# Patient Record
Sex: MALE | Race: WHITE | NOT HISPANIC OR LATINO
[De-identification: names, ages, dates, MRNs, and addresses within clinical notes are randomized per-mention and may not be internally consistent; named-entity substitution may affect disease eponyms.]

---

## 2021-03-28 ENCOUNTER — TRANSCRIPTION ENCOUNTER (OUTPATIENT)
Age: 31
End: 2021-03-28

## 2022-02-20 ENCOUNTER — EMERGENCY (EMERGENCY)
Facility: HOSPITAL | Age: 32
LOS: 0 days | Discharge: HOME | End: 2022-02-21
Attending: EMERGENCY MEDICINE
Payer: MEDICAID

## 2022-02-20 VITALS
RESPIRATION RATE: 18 BRPM | HEIGHT: 70 IN | TEMPERATURE: 98 F | OXYGEN SATURATION: 98 % | WEIGHT: 169.98 LBS | DIASTOLIC BLOOD PRESSURE: 93 MMHG | HEART RATE: 100 BPM | SYSTOLIC BLOOD PRESSURE: 144 MMHG

## 2022-02-20 DIAGNOSIS — Z91.018 ALLERGY TO OTHER FOODS: ICD-10-CM

## 2022-02-20 DIAGNOSIS — R45.851 SUICIDAL IDEATIONS: ICD-10-CM

## 2022-02-20 DIAGNOSIS — F41.9 ANXIETY DISORDER, UNSPECIFIED: ICD-10-CM

## 2022-02-20 DIAGNOSIS — F31.30 BIPOLAR DISORDER, CURRENT EPISODE DEPRESSED, MILD OR MODERATE SEVERITY, UNSPECIFIED: ICD-10-CM

## 2022-02-20 DIAGNOSIS — Z88.0 ALLERGY STATUS TO PENICILLIN: ICD-10-CM

## 2022-02-20 DIAGNOSIS — F43.0 ACUTE STRESS REACTION: ICD-10-CM

## 2022-02-20 DIAGNOSIS — U07.1 COVID-19: ICD-10-CM

## 2022-02-20 LAB
ALBUMIN SERPL ELPH-MCNC: 4.3 G/DL — SIGNIFICANT CHANGE UP (ref 3.3–5)
ALP SERPL-CCNC: 142 U/L — HIGH (ref 40–120)
ALT FLD-CCNC: 332 U/L — HIGH (ref 12–78)
ANION GAP SERPL CALC-SCNC: 6 MMOL/L — SIGNIFICANT CHANGE UP (ref 5–17)
APAP SERPL-MCNC: < 2 UG/ML — SIGNIFICANT CHANGE UP (ref 10–30)
APPEARANCE UR: CLEAR — SIGNIFICANT CHANGE UP
AST SERPL-CCNC: 127 U/L — HIGH (ref 15–37)
BASOPHILS # BLD AUTO: 0.02 K/UL — SIGNIFICANT CHANGE UP (ref 0–0.2)
BASOPHILS NFR BLD AUTO: 0.3 % — SIGNIFICANT CHANGE UP (ref 0–2)
BILIRUB SERPL-MCNC: 0.7 MG/DL — SIGNIFICANT CHANGE UP (ref 0.2–1.2)
BILIRUB UR-MCNC: NEGATIVE — SIGNIFICANT CHANGE UP
BUN SERPL-MCNC: 11 MG/DL — SIGNIFICANT CHANGE UP (ref 7–23)
CALCIUM SERPL-MCNC: 9.9 MG/DL — SIGNIFICANT CHANGE UP (ref 8.5–10.1)
CHLORIDE SERPL-SCNC: 105 MMOL/L — SIGNIFICANT CHANGE UP (ref 96–108)
CO2 SERPL-SCNC: 30 MMOL/L — SIGNIFICANT CHANGE UP (ref 22–31)
COLOR SPEC: YELLOW — SIGNIFICANT CHANGE UP
CREAT SERPL-MCNC: 0.98 MG/DL — SIGNIFICANT CHANGE UP (ref 0.5–1.3)
DIFF PNL FLD: NEGATIVE — SIGNIFICANT CHANGE UP
EOSINOPHIL # BLD AUTO: 0.09 K/UL — SIGNIFICANT CHANGE UP (ref 0–0.5)
EOSINOPHIL NFR BLD AUTO: 1.4 % — SIGNIFICANT CHANGE UP (ref 0–6)
ETHANOL SERPL-MCNC: <10 MG/DL — SIGNIFICANT CHANGE UP (ref 0–10)
GLUCOSE SERPL-MCNC: 94 MG/DL — SIGNIFICANT CHANGE UP (ref 70–99)
GLUCOSE UR QL: NEGATIVE — SIGNIFICANT CHANGE UP
HCT VFR BLD CALC: 48.2 % — SIGNIFICANT CHANGE UP (ref 39–50)
HGB BLD-MCNC: 15.3 G/DL — SIGNIFICANT CHANGE UP (ref 13–17)
IMM GRANULOCYTES NFR BLD AUTO: 0.2 % — SIGNIFICANT CHANGE UP (ref 0–1.5)
KETONES UR-MCNC: NEGATIVE — SIGNIFICANT CHANGE UP
LEUKOCYTE ESTERASE UR-ACNC: ABNORMAL
LYMPHOCYTES # BLD AUTO: 1.69 K/UL — SIGNIFICANT CHANGE UP (ref 1–3.3)
LYMPHOCYTES # BLD AUTO: 25.7 % — SIGNIFICANT CHANGE UP (ref 13–44)
MCHC RBC-ENTMCNC: 27 PG — SIGNIFICANT CHANGE UP (ref 27–34)
MCHC RBC-ENTMCNC: 31.7 GM/DL — LOW (ref 32–36)
MCV RBC AUTO: 85.2 FL — SIGNIFICANT CHANGE UP (ref 80–100)
MONOCYTES # BLD AUTO: 0.37 K/UL — SIGNIFICANT CHANGE UP (ref 0–0.9)
MONOCYTES NFR BLD AUTO: 5.6 % — SIGNIFICANT CHANGE UP (ref 2–14)
NEUTROPHILS # BLD AUTO: 4.4 K/UL — SIGNIFICANT CHANGE UP (ref 1.8–7.4)
NEUTROPHILS NFR BLD AUTO: 66.8 % — SIGNIFICANT CHANGE UP (ref 43–77)
NITRITE UR-MCNC: NEGATIVE — SIGNIFICANT CHANGE UP
PCP SPEC-MCNC: SIGNIFICANT CHANGE UP
PH UR: 5 — SIGNIFICANT CHANGE UP (ref 5–8)
PLATELET # BLD AUTO: 383 K/UL — SIGNIFICANT CHANGE UP (ref 150–400)
POTASSIUM SERPL-MCNC: 3.4 MMOL/L — LOW (ref 3.5–5.3)
POTASSIUM SERPL-SCNC: 3.4 MMOL/L — LOW (ref 3.5–5.3)
PROT SERPL-MCNC: 9.1 GM/DL — HIGH (ref 6–8.3)
PROT UR-MCNC: NEGATIVE — SIGNIFICANT CHANGE UP
RBC # BLD: 5.66 M/UL — SIGNIFICANT CHANGE UP (ref 4.2–5.8)
RBC # FLD: 13 % — SIGNIFICANT CHANGE UP (ref 10.3–14.5)
SALICYLATES SERPL-MCNC: <1.7 MG/DL — LOW (ref 2.8–20)
SODIUM SERPL-SCNC: 141 MMOL/L — SIGNIFICANT CHANGE UP (ref 135–145)
SP GR SPEC: 1.02 — SIGNIFICANT CHANGE UP (ref 1.01–1.02)
TSH SERPL-MCNC: 1.96 UU/ML — SIGNIFICANT CHANGE UP (ref 0.34–4.82)
UROBILINOGEN FLD QL: NEGATIVE — SIGNIFICANT CHANGE UP
WBC # BLD: 6.58 K/UL — SIGNIFICANT CHANGE UP (ref 3.8–10.5)
WBC # FLD AUTO: 6.58 K/UL — SIGNIFICANT CHANGE UP (ref 3.8–10.5)

## 2022-02-20 PROCEDURE — 99285 EMERGENCY DEPT VISIT HI MDM: CPT | Mod: 25

## 2022-02-20 PROCEDURE — 93005 ELECTROCARDIOGRAM TRACING: CPT

## 2022-02-20 PROCEDURE — 80053 COMPREHEN METABOLIC PANEL: CPT

## 2022-02-20 PROCEDURE — 36415 COLL VENOUS BLD VENIPUNCTURE: CPT

## 2022-02-20 PROCEDURE — 80307 DRUG TEST PRSMV CHEM ANLYZR: CPT

## 2022-02-20 PROCEDURE — 90792 PSYCH DIAG EVAL W/MED SRVCS: CPT | Mod: 95

## 2022-02-20 PROCEDURE — U0003: CPT

## 2022-02-20 PROCEDURE — 99285 EMERGENCY DEPT VISIT HI MDM: CPT

## 2022-02-20 PROCEDURE — 84443 ASSAY THYROID STIM HORMONE: CPT

## 2022-02-20 PROCEDURE — 81001 URINALYSIS AUTO W/SCOPE: CPT

## 2022-02-20 PROCEDURE — U0005: CPT

## 2022-02-20 PROCEDURE — 85025 COMPLETE CBC W/AUTO DIFF WBC: CPT

## 2022-02-20 PROCEDURE — 93010 ELECTROCARDIOGRAM REPORT: CPT

## 2022-02-20 NOTE — ED ADULT TRIAGE NOTE - CHIEF COMPLAINT QUOTE
Pt arrives from home, c/o suicidal thoughts. As per EMS, pt was telling family to kill him. In triage, denies SI/HI, any active plan to harm himself, drug/alcohol use, and hallucinations. Recently was weaned off Lexapro and last took it 2 weeks ago. Tested COVID+ on 2/11. Denies any physical complaints. Stressors include being in quarantine and having poor PO intake. 1:1 initiated for safety.

## 2022-02-20 NOTE — ED PROVIDER NOTE - OBJECTIVE STATEMENT
30 y/o male w/ a PMHx of anxiety presents to the ED via EMS c/o SI. As per EMS, pt was telling family to kill him. Pt says dad called EMS because pt was anxious due to isolation from COVID and his decreased appetite. Pt was unaware his father called EMS and unaware why he is here. Denies SI/HI in ED. Pt was recently weaned off Lexapro and last took it 2 weeks ago. Pt tested COVID+ on 2/11. Pt has no other complaints at this time. Occasional marijuana smoker. 30 y/o male w/ a PMHx of anxiety presents to the ED via EMS c/o SI. As per EMS, pt was telling family to kill him. Pt says dad called EMS because pt was anxious due to quarantine isolation from recent COVID infection and his decreased appetite (COVID symptoms were mild, + resolved). Pt was unaware his father called EMS and unaware why he is here. Denies SI/HI in ED. Pt was recently weaned off Lexapro, last took it 2 weeks ago, prior to COVID quarantine. Pt tested COVID+ on 2/11. Pt has no other complaints at this time. Occasional marijuana smoker.

## 2022-02-20 NOTE — ED BEHAVIORAL HEALTH ASSESSMENT NOTE - NSBHSATHC_PSY_A_CORE FT
once daily use on average w/ last use yesterday or the day before stating "I haven't really been smoking since the COVID."

## 2022-02-20 NOTE — ED BEHAVIORAL HEALTH ASSESSMENT NOTE - DETAILS
as above Verbal SP for patient to return to the ED if sx worsen or suicidality or other safety threats arise or if sx persist and he desires inpt care BTCM discussed w/ parents

## 2022-02-20 NOTE — ED BEHAVIORAL HEALTH ASSESSMENT NOTE - NSACTIVEVENT_PSY_ALL_CORE
COVID-19 infection & quarantine/Triggering events leading to humiliation, shame, and/or despair (e.g., Loss of relationship, financial or health status) (real or anticipated)/Chronic pain or other acute medical condition

## 2022-02-20 NOTE — ED PROVIDER NOTE - PATIENT PORTAL LINK FT
You can access the FollowMyHealth Patient Portal offered by [facility] by registering at the following website: [email or web address]. By joining [employer]’s FollowMyHealth portal, you will also be able to view your health information using other applications (apps) compatible with our system.

## 2022-02-20 NOTE — ED BEHAVIORAL HEALTH ASSESSMENT NOTE - OTHER
"self-employed as a " parents superficially cooperative at times w/ observed display of frustration surrounding being in the ED/having a forced psychiatric assessment annoyed Records Checked: [facility], [facility], [facility], [facility], [facility], [facility], [facility], [facility], [facility], [facility], [facility], [facility], [facility], [facility], [facility], [facility], [facility], [facility], [facility], [facility], [place] [facility], Social Media (For example - Facebook, [employer], [employer]), Web search, Forensic Databases not observed

## 2022-02-20 NOTE — ED PROVIDER NOTE - CARE PLAN
Principal Discharge DX:	Major depression   1 Principal Discharge DX:	Acute stress reaction  Secondary Diagnosis:	Persistent depressive disorder with anxious distress, currently moderate

## 2022-02-20 NOTE — ED BEHAVIORAL HEALTH NOTE - BEHAVIORAL HEALTH NOTE
===================  PRE-HOSPITAL COURSE  ===================  SOURCE:  RN and secondhand ED documentation.  DETAILS:  Per chart, patient was BIB mother and father due to pt expressing SI, asking parents to kill him. Patient reported that he had recently tapered off of his Lexapro and is not currently taking any psychotropic medications at the moment.      ============  ED COURSE  ============  SOURCE:  RN and secondhand ED documentation.  ARRIVAL:  Per chart and RN, patient arrived as a walk in, calm and compliant with triage.  BELONGINGS:  No belongings of note. All belongings were provided to hospital security, and patient is currently in a gown with a 1:1 staff member.  BEHAVIOR: RN described patient to be currently calm and cooperative, presenting with linear thought process and thought content WNL, is AAOx3, presenting with depressed mood and sad affect, remains in good behavioral and impulse control, is not currently violent/aggressive. RN stated that the patient is denying SI/HI/A/VH. RN stated that there are no visible marks, bruises, or lacerations on the body. RN stated that the patient appears to be well-groomed. RN stated patient had poor insight as to why he was currently in the ED.  TREATMENT:  Per chart and RN, patient did not require PRN medications.   VISITORS: None. ===================  PRE-HOSPITAL COURSE  ===================  SOURCE:  RN and secondhand ED documentation.  DETAILS:  Per chart, patient was BIB mother and father due to pt expressing SI, asking parents to kill him. Patient reported that he had recently tapered off of his Lexapro and is not currently taking any psychotropic medications at the moment.      ============  ED COURSE  ============  SOURCE:  RN and secondhand ED documentation.  ARRIVAL:  Per chart and RN, patient arrived as a walk in, calm and compliant with triage.  BELONGINGS:  No belongings of note. All belongings were provided to hospital security, and patient is currently in a gown with a 1:1 staff member.  BEHAVIOR: RN described patient to be currently calm and cooperative, presenting with linear thought process and thought content WNL, is AAOx3, presenting with depressed mood and sad affect, remains in good behavioral and impulse control, is not currently violent/aggressive. RN stated that the patient is denying SI/HI/A/VH. RN stated that there are no visible marks, bruises, or lacerations on the body. RN stated that the patient appears to be well-groomed. RN stated patient had poor insight as to why he was currently in the ED.  TREATMENT:  Per chart and RN, patient did not require PRN medications.   VISITORS: None.    [facility] attempted to contact pt's mother [phone] however she was unavailable. [facility] left a v/m requesting a call back. ===================  PRE-HOSPITAL COURSE  ===================  SOURCE:  RN and secondhand ED documentation.  DETAILS:  Per chart, patient was BIB EMS activated by parents due to pt expressing SI, asking parents to kill him. Patient reported that he had recently tapered off of his Lexapro and is not currently taking any psychotropic medications at the moment.      ============  ED COURSE  ============  SOURCE:  RN and secondhand ED documentation.  ARRIVAL:  Per chart and RN, patient arrived via EMS, calm and compliant with triage.  BELONGINGS:  No belongings of note. All belongings were provided to hospital security, and patient is currently in a gown with a 1:1 staff member.  BEHAVIOR: RN described patient to be currently calm and cooperative, presenting with linear thought process and thought content WNL, is AAOx3, presenting with depressed mood and sad affect, remains in good behavioral and impulse control, is not currently violent/aggressive. RN stated that the patient is denying SI/HI/A/VH. RN stated that there are no visible marks, bruises, or lacerations on the body. RN stated that the patient appears to be well-groomed. RN stated patient had poor insight as to why he was currently in the ED.  TREATMENT:  Per chart and RN, patient did not require PRN medications.   VISITORS: None.    [facility] spoke with patient's mother and father, Shannon, on [phone]. Patient is a 31M domiciled at home with his mother and father, single, without children, non-caregiver, unemployed. Patient was BIB parents due to concerns of worsening depressive symptoms and SI. Parents stated that patient was originally on Lexapro 20MG QD, and started to wean off of medications on July down to 5MG, and as of 3 weeks ago has completely been off of medications. Parents stated that the patient was offered to be on a different psychotropic medication, however patient refused to be on any medication as he felt that it was not helping. Patient had then tested positive for COVID one week ago, and subsequently parents started observing pt to be more depressed, marked by low motivation, poor sleep and appetite, low energy, and frequent crying spells. Parents stated that for the past week, pt expressed passive SI without plan/intent and denies recent SA/self-injury, makes statements such as "I don't want to be awake" and "why can't you kill me mom?", no plan or intent. Parents are concerned that the patient's symptoms have not been improving and patient refuses to comply with outpatient psychiatric care, which led to parents calling 911 for patient to be brought to the ED. Parents stated that the patient was laying on the floor today, and needed to be persuaded by EMS to come to the hospital for evaluation. Parents denied pt was aggressive/hallucinating.     Parents stated that at baseline, patient has depressed mood, engages well with others, remains calm, with linear thought process. Parents stated that the patient smokes marijuana daily. Patient was admitted to [facility] approx 7-8 yrs ago for agitation, and since d/c has been following up with outpatient care. Parents denied pt has legal hx, denied other substance abuse, denies access to firearms. Parents report pt is allergic to Penicillin.     BTCM spoke with parents to inform them that the recommended level of care is continued outpatient care, and IPP admission is not warranted at this time. Parents expressed concern that the patient's symptoms may not improve and that they will continue to worsen. BTCM provided psychoeducation regarding criteria for inpatient admission and why outpatient care is recommended at this time. BTCM advised parents to bring patient back to the ED in the event of future psychiatric emergency. Parents verbalized understanding of the plan.     COVID Exposure Screen- collateral (i.e. third-party, chart review, belongings, etc; include EMS and ED staff)  1.        *Has the patient had a COVID-19 test in the last 90 days?  ( x ) Yes   (  ) No   (  ) Unknown- Reason: _____  IF YES PROCEED TO QUESTION #2. IF NO OR UNKNOWN, PLEASE SKIP TO QUESTION #3.  2.        Date of test(s) and result(s): 2/11/2022  3.        *Has the patient tested positive for COVID-19 antibodies? (  ) Yes   ( x ) No   (  ) Unknown- Reason: _____  IF YES PRO  CEED TO QUESTION #4. IF NO or UNKNOWN, PLEASE SKIP TO QUESTION #5.  4.        Date of positive antibody test: ________  5.        *Has the patient received 2 doses of the COVID-19 vaccine? (  ) Yes   (x  ) No   (  ) Unknown- Reason: _____  IF YES PROCEED TO QUESTION #6. IF NO or UNKNOWN, PLEASE SKIP TO QUESTION #7.  6.         Date of second dose: ________  7.        *In the past 10 days, has the patient been around anyone with a positive COVID-19 test?* (  ) Yes   ( x ) No   (  ) Unknown- Reason: __  IF YES PROCEED TO QUESTION #8. IF NO or UNKNOWN, PLEASE SKIP TO QUESTION #13.  8.        Was the patient within 6 feet of them for at least 15 minutes? (  ) Yes   (  ) No   (  ) Unknown- Reason: ____  9.        Did the patient provide care for them? (  ) Yes   (  ) No   (  ) Unknown- Reason: ______  10.     Did the patient have direct physical contact with them (touched, hugged, or kissed them)? (  ) Yes   (  ) No	(  ) Unknown- Reason: __  11.     Did the patient share eating or drinking utensils with them? (  ) Yes   (  ) No	(  ) Unknown- Reason: ____  12.     Did they sneeze, cough, or somehow get respiratory droplets on the patient? (  ) Yes   (  ) No	(  ) Unknown- Reason: ______  13.     *Has the patient been out of [place] within the past 10 days?* (  ) Yes   ( x ) No   (  ) Unknown- Reason: _____  IF YES PLEASE ANSWER THE FOLLOWING QUESTIONS:  14.     Which state/country did they go to? ______  15.     Were they there over 24 hours? (  ) Yes   (  ) No	(  ) Unknown- Reason: ______ ===================  PRE-HOSPITAL COURSE  ===================  SOURCE:  RN and secondhand ED documentation.  DETAILS:  Per chart, patient was BIB EMS activated by parents due to pt expressing SI, asking parents to kill him. Patient reported that he had recently tapered off of his Lexapro and is not currently taking any psychotropic medications at the moment.      ============  ED COURSE  ============  SOURCE:  RN and secondhand ED documentation.  ARRIVAL:  Per chart and RN, patient arrived via EMS, calm and compliant with triage.  BELONGINGS:  No belongings of note. All belongings were provided to hospital security, and patient is currently in a gown with a 1:1 staff member.  BEHAVIOR: RN described patient to be currently calm and cooperative, presenting with linear thought process and thought content WNL, is AAOx3, presenting with depressed mood and sad affect, remains in good behavioral and impulse control, is not currently violent/aggressive. RN stated that the patient is denying SI/HI/A/VH. RN stated that there are no visible marks, bruises, or lacerations on the body. RN stated that the patient appears to be well-groomed. RN stated patient had poor insight as to why he was currently in the ED.  TREATMENT:  Per chart and RN, patient did not require PRN medications.   VISITORS: None.    [facility] spoke with patient's mother and father, Shannon, on [phone]. Patient is a 31M domiciled at home with his mother and father, single, without children, non-caregiver, unemployed. Patient was BIB parents due to concerns of worsening depressive symptoms and SI. Parents stated that patient was originally on Lexapro 20MG QD, and started to wean off of medications on July down to 5MG, and as of 3 weeks ago has completely been off of medications. Parents stated that the patient was offered to be on a different psychotropic medication, however patient refused to be on any medication as he felt that it was not helping. Patient had then tested positive for COVID one week ago, and subsequently parents started observing pt to be more depressed, marked by low motivation, poor sleep and appetite, low energy, and frequent crying spells. Parents stated that for the past week, pt expressed passive SI without plan/intent and denies recent SA/self-injury, makes statements such as "I don't want to be awake" and "why can't you kill me mom?", no plan or intent. Parents are concerned that the patient's symptoms have not been improving and patient refuses to comply with outpatient psychiatric care, which led to parents calling 911 for patient to be brought to the ED. Parents stated that the patient was laying on the floor today expressing passive SI repetitively, and needed to be persuaded by EMS to come to the hospital for evaluation. Parents denied pt was aggressive/hallucinating.     Parents stated that at baseline, patient has depressed mood, engages well with others, remains calm, with linear thought process. Parents stated that the patient smokes marijuana daily. Patient was admitted to [facility] approx 7-8 yrs ago for agitation, and since d/c has been following up with outpatient care. Parents denied pt has legal hx, denied other substance abuse, denies access to firearms. Parents report pt is allergic to Penicillin.     CM spoke with parents to inform them that the recommended level of care is continued outpatient care, and IPP admission is not warranted at this time. Parents expressed concern that the patient's symptoms may not improve and that they will continue to worsen. CM provided psychoeducation regarding criteria for inpatient admission and why outpatient care is recommended at this time. BTCM advised parents to bring patient back to the ED in the event of future psychiatric emergency. Parents verbalized understanding of the plan.     COVID Exposure Screen- collateral (i.e. third-party, chart review, belongings, etc; include EMS and ED staff)  1.        *Has the patient had a COVID-19 test in the last 90 days?  ( x ) Yes   (  ) No   (  ) Unknown- Reason: _____  IF YES PROCEED TO QUESTION #2. IF NO OR UNKNOWN, PLEASE SKIP TO QUESTION #3.  2.        Date of test(s) and result(s): 2/11/2022  3.        *Has the patient tested positive for COVID-19 antibodies? (  ) Yes   ( x ) No   (  ) Unknown- Reason: _____  IF YES PRO  CEED TO QUESTION #4. IF NO or UNKNOWN, PLEASE SKIP TO QUESTION #5.  4.        Date of positive antibody test: ________  5.        *Has the patient received 2 doses of the COVID-19 vaccine? (  ) Yes   (x  ) No   (  ) Unknown- Reason: _____  IF YES PROCEED TO QUESTION #6. IF NO or UNKNOWN, PLEASE SKIP TO QUESTION #7.  6.         Date of second dose: ________  7.        *In the past 10 days, has the patient been around anyone with a positive COVID-19 test?* (  ) Yes   ( x ) No   (  ) Unknown- Reason: __  IF YES PROCEED TO QUESTION #8. IF NO or UNKNOWN, PLEASE SKIP TO QUESTION #13.  8.        Was the patient within 6 feet of them for at least 15 minutes? (  ) Yes   (  ) No   (  ) Unknown- Reason: ____  9.        Did the patient provide care for them? (  ) Yes   (  ) No   (  ) Unknown- Reason: ______  10.     Did the patient have direct physical contact with them (touched, hugged, or kissed them)? (  ) Yes   (  ) No	(  ) Unknown- Reason: __  11.     Did the patient share eating or drinking utensils with them? (  ) Yes   (  ) No	(  ) Unknown- Reason: ____  12.     Did they sneeze, cough, or somehow get respiratory droplets on the patient? (  ) Yes   (  ) No	(  ) Unknown- Reason: ______  13.     *Has the patient been out of [place] within the past 10 days?* (  ) Yes   ( x ) No   (  ) Unknown- Reason: _____  IF YES PLEASE ANSWER THE FOLLOWING QUESTIONS:  14.     Which state/country did they go to? ______  15.     Were they there over 24 hours? (  ) Yes   (  ) No	(  ) Unknown- Reason: ______

## 2022-02-20 NOTE — ED PROVIDER NOTE - PROGRESS NOTE DETAILS
Zacarias Rojas for attending Dr. Ramirez:  As per parents, pt has had multiple conversations over text where he expresses SI. Zacarias Rojas for attending Dr. Ramirez:  Tele psych aware of ED consult. Pt cleared for d/c by tele-psychiatry. Erich Lopez DO

## 2022-02-20 NOTE — ED PROVIDER NOTE - MUSCULOSKELETAL, MLM
Spine appears normal, range of motion is not limited, no muscle or joint tenderness Spine appears normal, range of motion is not limited, no muscle or joint tenderness.  GAINES x 4.

## 2022-02-20 NOTE — ED BEHAVIORAL HEALTH ASSESSMENT NOTE - SUMMARY
This is a 31 year old single,  self-employed male, non-caregiver, domiciled with parents, with past psychiatric history of Major Depressive Disorder and Generalized Anxiety, non-adherent to outpatient treatment, recently weaned off of Lexapro (last dose 2-3 weeks ago; not currently on psychotropic medications), one past psychiatric admission ([facility] 12/26-12/29/ for depression & irritability iso of medication non-adherence and chronic cannabis use), no known suicide attempts or non-suicidal self injury, no history of violence, aggression, legal issues or substance use and past medical history of recent COVID-19 infection (dx w/+test 2/11/22) who presents to the ED BIB EMS activated by parents after patient reportedly conveyed a suicidal statement. In the ED, he is denying any SI but expresses feeling recently more anxious in the setting of having to quarantine & COVID related symptoms. Psychiatric assessment and collateral review are consistent with acute stress as conveyed by patient without evident suicidality. He does have an underlying persistent vs chronic major depressive disorder that contributes to his proclivity for irritability and poor distress tolerance and could benefit from re-engaging in pharmacological and psychotherapeutic strategies to address this. Nonetheless, he is not actively suicidal, homicidal, manic or psychotic and does not meet criteria for involuntary psychiatric hospitalization at this time. This is a 31 year old single,  self-employed male, non-caregiver, domiciled with parents, with past psychiatric history of Major Depressive Disorder and Generalized Anxiety, non-adherent to outpatient treatment, recently weaned off of Lexapro (last dose 2-3 weeks ago; not currently on psychotropic medications), one past psychiatric admission ([facility] 12/26-12/29/11 for depression & irritability iso of medication non-adherence and chronic cannabis use), no known suicide attempts or non-suicidal self injury, no history of violence, aggression, legal issues or substance use and past medical history of recent COVID-19 infection (dx w/+test 2/11/22) who presents to the ED BIB EMS activated by parents after patient reportedly conveyed a suicidal statement. In the ED, he is denying any SI but expresses feeling recently more anxious in the setting of having to quarantine & COVID related symptoms. Psychiatric assessment and collateral review are consistent with acute stress as conveyed by patient without evident suicidality. He does have an underlying persistent vs chronic major depressive disorder that contributes to his proclivity for irritability and poor distress tolerance and could benefit from re-engaging in pharmacological and psychotherapeutic strategies to address this. Nonetheless, he is not actively suicidal, homicidal, manic or psychotic and does not meet criteria for involuntary psychiatric hospitalization at this time.

## 2022-02-20 NOTE — ED PROVIDER NOTE - CLINICAL SUMMARY MEDICAL DECISION MAKING FREE TEXT BOX
30 y/o with hx of anxiety recently weaned of Lexapro, last does 2 weeks ago. Has been on home quarantining x10 dyas regarding +COVID(mild symptoms) no longer symptomatic. BIBA from home after texts and verbal statements expressing SI asking them to kill him. Pt denies everything in ED but parents have texts on the phone. Plan: 1 to 1, EKG, psych, labs, tele psych eval, and observation. 30 y/o with hx of anxiety recently weaned of Lexapro, last does 2 weeks ago. Has been on home quarantining x10 dyas regarding +COVID(mild symptoms) no longer symptomatic. BIBA from home after texts and verbal statements expressing SI asking them to kill him. Pt denies everything in ED but parents have texts on the phone. Plan: 1 to 1, EKG, psych, labs, tele-psych eval, and observation.

## 2022-02-20 NOTE — ED PROVIDER NOTE - CONSTITUTIONAL, MLM
Well appearing, awake, alert, oriented to person, place, time/situation and in no apparent distress. Nontoxic. normal...

## 2022-02-20 NOTE — ED ADULT NURSE NOTE - OBJECTIVE STATEMENT
Pt arrives from home with parents c/o suicidal thoughts. As per father, pt was telling family to kill him. In triage, denies SI/HI, any active plan to harm himself, drug/alcohol use, and hallucinations. Recently was weaned off Lexapro and last took it 2 weeks ago. Tested COVID+ on 2/11. Denies any physical complaints. Stressors include being in quarantine and having poor PO intake. 1:1 initiated for safety. awaiting MD bedside

## 2022-02-20 NOTE — ED PROVIDER NOTE - NSFOLLOWUPINSTRUCTIONS_ED_ALL_ED_FT
Follow up with your psychiatrist in 1-3 days    Depression    WHAT YOU NEED TO KNOW:    Depression is a medical condition that causes feelings of sadness or hopelessness that do not go away. Depression may cause you to lose interest in things you used to enjoy. These feelings may interfere with your daily life.    DISCHARGE INSTRUCTIONS:    Call your local emergency number (911 in the ) if:     You think about harming yourself or someone else.      You have done something on purpose to hurt yourself.    Call your therapist or doctor if:     Your symptoms do not improve.      You cannot make it to your next appointment.       You have new symptoms.      You have questions or concerns about your condition or care.    The following resources are available at any time to help you, if needed:     National Suicide Prevention Lifeline: 1-254.625.2897 (7-774-960-TALK)      Suicide Hotline: 1-540.659.3993 (6-493-ERDKZME)      For a list of international numbers: https://[employer].org/find-help/international-resources/    Medicines:     Antidepressants may be given to improve or balance your mood. You may need to take this medicine for several weeks before you begin to feel better.      Take your medicine as directed. Contact your healthcare provider if you think your medicine is not helping or if you have side effects. Tell him of her if you are allergic to any medicine. Keep a list of the medicines, vitamins, and herbs you take. Include the amounts, and when and why you take them. Bring the list or the pill bottles to follow-up visits. Carry your medicine list with you in case of an emergency.    Therapy is often used together with medicine to relieve depression. Therapy is a way for you to talk about your feelings and anything that may be causing depression. Therapy can be done alone or in a group. It may also be done with family members or a significant other.    Self-care:     Get regular physical activity. Try to be active for 30 minutes, 3 to 5 days a week. Physical activity can help relieve depression. Work with your healthcare provider to develop a plan that you enjoy. It may help to ask someone to be active with you.      Create a regular sleep schedule. A routine can help you relax before bed. Listen to music, read, or do yoga. Try to go to bed and wake up at the same time every day. Sleep is important for emotional health.      Eat a variety of healthy foods. Healthy foods include fruits, vegetables, whole-grain breads, low-fat dairy products, lean meats, fish, and cooked beans. A healthy meal plan is low in fat, salt, and added sugar.      Do not drink alcohol or use drugs. Alcohol and drugs can make depression worse. Talk to your therapist or doctor if you need help quitting.    Follow up with your healthcare provider as directed: Your healthcare provider will monitor your progress at follow-up visits. He or she will also monitor your medicine if you take antidepressants. Your healthcare provider will ask if the medicine is helping. Tell him or her about any side effects or problems you may have with your medicine. The type or amount of medicine may need to be changed. Write down your questions so you remember to ask them during your visits.

## 2022-02-20 NOTE — ED BEHAVIORAL HEALTH ASSESSMENT NOTE - HPI (INCLUDE ILLNESS QUALITY, SEVERITY, DURATION, TIMING, CONTEXT, MODIFYING FACTORS, ASSOCIATED SIGNS AND SYMPTOMS)
This is a 31 year old single,  self-employed male, non-caregiver, domiciled with parents, with past psychiatric history of Major Depressive Disorder and Generalized Anxiety, in outpatient treatment, recently weaned off of Lexapro 5 mg daily (last dose 2 weeks ago; not currently on psychotropic medications), one past psychiatric admission (~10 years ago for a few days after abruptly coming off of medications), no known suicide attempts or non-suicidal self injury, no history of violence, aggression, legal issues or substance use and past medical history of recent COVID-19 infection (dx w/+test 2/11/22) who presents to the ED BIB EMS activated by parents after patient conveyed a suicidal statement. In the ED, he is denying any SI but expresses feeling recently more anxious in the setting of having to quarantine & COVID related symptoms. Psychiatry consulted for evaluation.     On assessment, patient relays "I was having anxiety and my dad called and I'm here." I was saying I don't want to do this anymore because I've been waking up with stomach problems due to the anxiety" and associated appetite issues. after this COVID thing my anxiety has been through the chart." He tells me that he caught COVID, selective with food to prevent nausea, feeling trapped in quarantine and with limited food choices... He relays feeling annoyed from being trapped and alone with excessive and erratic sleep pattern due to the lack of structure. He reports low energy which he attributes to the COVID noting he also had fever for 2 days, body aches, congestions then subsequently GI distress. He denies any issues with concentration impairment. He relays always being hard on himself. He denies any feelings of hopelessness, death wishes or SI. He reports having had a difficult day leading up to his ED presentation surrounding not being able to eat comfortab... "I don't want to do this" "I don't want to keep choosing the food" etc "but it's been like this for a week and a half and they didn't want to deal with it so they called the police on me."    He denies any HI, AVH, paranoia or symptoms of flip currently on in his past.    "I'm just tired and I don't want to be in this white room anymore.     COVID Exposure Screen- Patient  Have you had a COVID-19 test in the last 90 days? Yes, positive COVID test on 2/11   Have you tested positive for COVID-19 antibodies? No  Have you received 2 doses of the COVID-19 vaccine? No  In the past 10 days, have you been around anyone with a positive COVID-19 test? No, however patient is COVID positive  Have you been out of [place] within the past 10 days? No This is a 31 year old single,  self-employed male, non-caregiver, domiciled with parents, with past psychiatric history of Major Depressive Disorder and Generalized Anxiety, non-adherent to outpatient treatment, recently weaned off of Lexapro (last dose 2-3 weeks ago; not currently on psychotropic medications), one past psychiatric admission ([facility] 12/26-12/29/ for depression & irritability iso of medication non-adherence and chronic cannabis use), no known suicide attempts or non-suicidal self injury, no history of violence, aggression, legal issues or substance use and past medical history of recent COVID-19 infection (dx w/+test 2/11/22) who presents to the ED BIB EMS activated by parents after patient reportedly conveyed a suicidal statement. In the ED, he is denying any SI but expresses feeling recently more anxious in the setting of having to quarantine & COVID related symptoms. Psychiatry consulted for evaluation.     On assessment, patient relays "I was having anxiety and my dad called and I'm here." He denies expressing suicidality but rather tells me "I was saying I don't want to do this anymore because I've been waking up with stomach problems due to the anxiety" and recent / current COVID infection elaborating that he has had associated appetite issues, has to carefully select his food choices to minimize stomach upset and he's tired of eating bland foods / having to select from a list of unappealing "sick food" options everyday. He relays that "after this COVID thing my anxiety has been through the chart." He relays also feeling "trapped" with having to quarantine and with limited food choices. He relays annoyed mood in the last week or so "from being trapped and alone" with excessive (up to 12 hours total per day) and erratic sleep pattern due to the lack of structure. He reports low energy which he attributes to the COVID noting he also had fever for 2 days, body aches, congestions then subsequently GI distress. He denies any issues with concentration impairment.     Patient relays always being hard on himself and denies any uptick in self blame or feelings of worthlessness. He denies any feelings of hopelessness, death wishes or SI currently, recently or prior to ED presentation. He reports having had a difficult day leading up to his ED presentation surrounding not being able to eat comfortably and reiterates having made statements such as "I don't want to do this" "I don't want to keep choosing the food" and such "but it's been like this for a week and a half and they didn't want to deal with it so they called the police on me." He does not refute having requested his parents to kill him but rather acknowledges that it is not suicidality to make a meaningless request in frustration knowing that his parents would never oblige. He denies any HI, AVH, paranoia or symptoms of flip currently on in his past.     Patient reports having once been hospitalized approximately 10 years ago after abruptly going off of medications, noting he was only there for a few days and the treatment team kept saying he did not need to be there. He denies any other inpatient encounters, denies past suicide attempts or preparations towards suicide and denies any past instance of being physically violent or aggression. He denies any nicotine, alcohol or illicit substance use but admits to average use of cannabis once daily up until recently when he caught COVID. He relays last smoking yesterday or the day before and denies any consequences of cannabis use in his history. ROS is otherwise negative. He concludes by stating "I'm just tired and I don't want to be in this white room anymore" noting that "it's not very therapeutic." He expresses a desire to return home and is not interested in inpatient treatment at this time.     COVID Exposure Screen- Patient  Have you had a COVID-19 test in the last 90 days? Yes, positive COVID test on 2/11   Have you tested positive for COVID-19 antibodies? No  Have you received 2 doses of the COVID-19 vaccine? No  In the past 10 days, have you been around anyone with a positive COVID-19 test? No, however patient is COVID positive  Have you been out of [place] within the past 10 days? No This is a 31 year old single,  self-employed male, non-caregiver, domiciled with parents, with past psychiatric history of Major Depressive Disorder and Generalized Anxiety, non-adherent to outpatient treatment, recently weaned off of Lexapro (last dose 2-3 weeks ago; not currently on psychotropic medications), one past psychiatric admission ([facility] 12/26-12/29/11 for depression & irritability iso of medication non-adherence and chronic cannabis use), no known suicide attempts or non-suicidal self injury, no history of violence, aggression, legal issues or substance use and past medical history of recent COVID-19 infection (dx w/+test 2/11/22) who presents to the ED BIB EMS activated by parents after patient reportedly conveyed a suicidal statement. In the ED, he is denying any SI but expresses feeling recently more anxious in the setting of having to quarantine & COVID related symptoms. Psychiatry consulted for evaluation.     On assessment, patient relays "I was having anxiety and my dad called and I'm here." He denies expressing suicidality but rather tells me "I was saying I don't want to do this anymore because I've been waking up with stomach problems due to the anxiety" and recent / current COVID infection elaborating that he has had associated appetite issues, has to carefully select his food choices to minimize stomach upset and he's tired of eating bland foods / having to select from a list of unappealing "sick food" options everyday. He relays that "after this COVID thing my anxiety has been through the chart." He relays also feeling "trapped" with having to quarantine and with limited food choices. He relays annoyed mood in the last week or so "from being trapped and alone" with excessive (up to 12 hours total per day) and erratic sleep pattern due to the lack of structure. He reports low energy which he attributes to the COVID noting he also had fever for 2 days, body aches, congestions then subsequently GI distress. He denies any issues with concentration impairment.     Patient relays always being hard on himself and denies any uptick in self blame or feelings of worthlessness. He denies any feelings of hopelessness, death wishes or SI currently, recently or prior to ED presentation. He reports having had a difficult day leading up to his ED presentation surrounding not being able to eat comfortably and reiterates having made statements such as "I don't want to do this" "I don't want to keep choosing the food" and such "but it's been like this for a week and a half and they didn't want to deal with it so they called the police on me." He does not refute having requested his parents to kill him but rather acknowledges that it is not suicidality to make a meaningless request in frustration knowing that his parents would never oblige. He denies any HI, AVH, paranoia or symptoms of flip currently on in his past.     Patient reports having once been hospitalized approximately 10 years ago after abruptly going off of medications, noting he was only there for a few days and the treatment team kept saying he did not need to be there. He denies any other inpatient encounters, denies past suicide attempts or preparations towards suicide and denies any past instance of being physically violent or aggression. He denies any nicotine, alcohol or illicit substance use but admits to average use of cannabis once daily up until recently when he caught COVID. He relays last smoking yesterday or the day before and denies any consequences of cannabis use in his history. ROS is otherwise negative. He concludes by stating "I'm just tired and I don't want to be in this white room anymore" noting that "it's not very therapeutic." He expresses a desire to return home and is not interested in inpatient treatment at this time.     COVID Exposure Screen- Patient  Have you had a COVID-19 test in the last 90 days? Yes, positive COVID test on 2/11   Have you tested positive for COVID-19 antibodies? No  Have you received 2 doses of the COVID-19 vaccine? No  In the past 10 days, have you been around anyone with a positive COVID-19 test? No, however patient is COVID positive  Have you been out of [place] within the past 10 days? No

## 2022-02-20 NOTE — ED BEHAVIORAL HEALTH ASSESSMENT NOTE - DIFFERENTIAL
Acute stress reaction (stress related to COVID-19 sx & quarantine requirements)  Major vs Persistent depressive disorder w/ anxious distress  Generalized anxiety disorder  Cannabis use disorder

## 2022-02-20 NOTE — ED BEHAVIORAL HEALTH ASSESSMENT NOTE - DESCRIPTION
lives with parents; self employed as a "." single, never ; no children; denies family suicide hx or pertinent psychiatric or medical conditions in the family Buck - [phone] - Wiliam Pineda [phone] -  as per HPI ED course and collateral are as per [facility] (ED Behavioral health) note     Patient gave verbal permission to obtain collateral from collateral his parents as noted above and provided their contact information as such: Buck Van [phone] - Father & Miriam [phone] - Mother

## 2022-02-20 NOTE — ED PROVIDER NOTE - PSYCHIATRIC, MLM
Alert and oriented to person, place, time/situation. normal mood and affect. no apparent risk to self or others. Alert and oriented to person, place, time/situation. normal mood and affect. no apparent risk to self or others.  Cooperative.

## 2022-02-21 VITALS
RESPIRATION RATE: 18 BRPM | HEART RATE: 95 BPM | TEMPERATURE: 98 F | DIASTOLIC BLOOD PRESSURE: 82 MMHG | OXYGEN SATURATION: 96 % | SYSTOLIC BLOOD PRESSURE: 117 MMHG

## 2022-02-21 DIAGNOSIS — F43.0 ACUTE STRESS REACTION: ICD-10-CM

## 2022-02-21 DIAGNOSIS — F12.20 CANNABIS DEPENDENCE, UNCOMPLICATED: ICD-10-CM

## 2022-02-21 DIAGNOSIS — F34.1 DYSTHYMIC DISORDER: ICD-10-CM

## 2022-02-21 LAB — SARS-COV-2 RNA SPEC QL NAA+PROBE: DETECTED

## 2022-07-12 ENCOUNTER — OUTPATIENT (OUTPATIENT)
Dept: OUTPATIENT SERVICES | Facility: HOSPITAL | Age: 32
LOS: 1 days | Discharge: STILL ADMITTED OR ADMINISTRATIVE | End: 2022-07-12
Payer: MEDICAID

## 2022-07-12 PROBLEM — F41.9 ANXIETY DISORDER, UNSPECIFIED: Chronic | Status: ACTIVE | Noted: 2022-02-22

## 2022-07-12 PROCEDURE — 90839 PSYTX CRISIS INITIAL 60 MIN: CPT | Mod: 1L,NC,95

## 2022-07-13 DIAGNOSIS — F12.20 CANNABIS DEPENDENCE, UNCOMPLICATED: ICD-10-CM

## 2022-07-13 DIAGNOSIS — F39 UNSPECIFIED MOOD [AFFECTIVE] DISORDER: ICD-10-CM

## 2024-05-05 ENCOUNTER — NON-APPOINTMENT (OUTPATIENT)
Age: 34
End: 2024-05-05

## 2024-09-27 NOTE — ED BEHAVIORAL HEALTH ASSESSMENT NOTE - PERCEPTIONS
I have personally seen and examined the patient. I have collaborated with and supervised the No abnormalities